# Patient Record
Sex: MALE | Race: WHITE | Employment: FULL TIME | URBAN - METROPOLITAN AREA
[De-identification: names, ages, dates, MRNs, and addresses within clinical notes are randomized per-mention and may not be internally consistent; named-entity substitution may affect disease eponyms.]

---

## 2017-07-08 ENCOUNTER — OFFICE VISIT (OUTPATIENT)
Dept: URGENT CARE | Facility: URGENT CARE | Age: 27
End: 2017-07-08
Payer: COMMERCIAL

## 2017-07-08 VITALS
TEMPERATURE: 98.7 F | OXYGEN SATURATION: 98 % | BODY MASS INDEX: 24.44 KG/M2 | DIASTOLIC BLOOD PRESSURE: 70 MMHG | HEART RATE: 99 BPM | SYSTOLIC BLOOD PRESSURE: 102 MMHG | WEIGHT: 165 LBS | HEIGHT: 69 IN

## 2017-07-08 DIAGNOSIS — R09.A2 SENSATION OF LUMP IN THROAT: Primary | ICD-10-CM

## 2017-07-08 DIAGNOSIS — R52 BODY ACHES: ICD-10-CM

## 2017-07-08 LAB
DEPRECATED S PYO AG THROAT QL EIA: NORMAL
MICRO REPORT STATUS: NORMAL
SPECIMEN SOURCE: NORMAL

## 2017-07-08 PROCEDURE — 99213 OFFICE O/P EST LOW 20 MIN: CPT | Performed by: FAMILY MEDICINE

## 2017-07-08 PROCEDURE — 87880 STREP A ASSAY W/OPTIC: CPT | Performed by: FAMILY MEDICINE

## 2017-07-08 PROCEDURE — 87081 CULTURE SCREEN ONLY: CPT | Performed by: FAMILY MEDICINE

## 2017-07-08 NOTE — PATIENT INSTRUCTIONS
Recheck in 48 hours with your primary provider.   Ibuprofen or aleve for the body aches.   Rest.  If any fevers (a temperature of 100.5 or above) or worsening symptoms develop over the weekend, return to care.

## 2017-07-08 NOTE — MR AVS SNAPSHOT
After Visit Summary   7/8/2017    Omer Graves    MRN: [id number]           Patient Information     Date Of Birth          1990        Visit Information        Provider Department      7/8/2017 11:45 AM Marietta Lovett DO [facility]        Today's Diagnoses     Sensation of lump in throat    -  1    Body aches          Care Instructions    Recheck in 48 hours with your primary provider.   Ibuprofen or aleve for the body aches.   Rest.  If any fevers (a temperature of 100.5 or above) or worsening symptoms develop over the weekend, return to care.            Follow-ups after your visit        Who to contact     If you have questions or need follow up information about today's clinic visit or your schedule please contact [facility] directly at [phone].  Normal or non-critical lab and imaging results will be communicated to you by Nextancehart, letter or phone within 4 business days after the clinic has received the results. If you do not hear from us within 7 days, please contact the clinic through Nextancehart or phone. If you have a critical or abnormal lab result, we will notify you by phone as soon as possible.  Submit refill requests through [facility] or call your pharmacy and they will forward the refill request to us. Please allow 3 business days for your refill to be completed.          Additional Information About Your Visit        MyChart Information     [facility] gives you secure access to your electronic health record. If you see a primary care provider, you can also send messages to your care team and make appointments. If you have questions, please call your primary care clinic.  If you do not have a primary care provider, please call [phone] and they will assist you.        Care EveryWhere ID     This is your Care EveryWhere ID. This could be used by other organizations to access your [place] medical records  [id number]       "  Your Vitals Were     Pulse Temperature Height Pulse Oximetry BMI (Body Mass Index)       99 98.7  F (37.1  C) (Oral) 5' 9\" (1.753 m) 98% 24.37 kg/m2        Blood Pressure from Last 3 Encounters:   07/08/17 102/70   11/01/13 121/78   09/13/12 114/66    Weight from Last 3 Encounters:   07/08/17 165 lb (74.8 kg)   11/05/13 170 lb (77.1 kg)   09/13/12 166 lb 9.6 oz (75.6 kg)              We Performed the Following     Beta strep group A culture     Strep, Rapid Screen          Today's Medication Changes          These changes are accurate as of: 7/8/17 12:46 PM.  If you have any questions, ask your nurse or doctor.               Stop taking these medicines if you haven't already. Please contact your care team if you have questions.     HYDROcodone-acetaminophen 5-325 MG per tablet   Commonly known as:  NORCO   Stopped by:  Marietta Lovett,                     Primary Care Provider    Physician No Ref-Primary       No address on file        Equal Access to Services     [facility]: Hadii aad ku hadasho Sogill, waaxda luqadaha, qaybta kaalmada adevirginia, clementine richardson . So [facility] [phone].    ATENCIÓN: Si habla español, tiene a mcgovern disposición servicios gratuitos de asistencia lingüística. Llame al [phone].    We comply with applicable federal civil rights laws and [place] laws. We do not discriminate on the basis of race, color, national origin, age, disability sex, sexual orientation or gender identity.            Thank you!     Thank you for choosing [facility]  for your care. Our goal is always to provide you with excellent care. Hearing back from our patients is one way we can continue to improve our services. Please take a few minutes to complete the written survey that you may receive in the mail after your visit with us. Thank you!             Your Updated Medication List - Protect others around you: Learn how to safely use, store and throw " away your medicines at www.disposemymeds.org.          This list is accurate as of: 7/8/17 12:46 PM.  Always use your most recent med list.                   Brand Name Dispense Instructions for use Diagnosis    NO ACTIVE MEDICATIONS
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
## 2017-07-08 NOTE — PROGRESS NOTES
"SUBJECTIVE:   Omer Graves is a 27 year old male presenting with a chief complaint of bite from a chipmunk.   He was camping and feeding some of the chipmunks at the campground that were friendly.  When he ran out of food, the chipmunk continued nibbling at the edge of his fingernail and it bled a little bit.  No puncture wound or deep bite occurred.  This happened on 7/3/17.   On 7/6/17 he started feeling ill with the sensation of a lump in his throat and sore throat when he swallows and body aches.  No joint redness or swelling.  He also has been feeling feverish, but when taking temps, no fevers.  No known tick bites.  Multiple mosquito bites.  No neck pain/stiffness or headaches.  No swollen lymph nodes noticed anywhere on the body.  No rashes.    ROS:  5-Point Review of Systems Negative-- Except as stated above.    OBJECTIVE  /70 (BP Location: Right arm, Patient Position: Chair, Cuff Size: Adult Regular)  Pulse 99  Temp 98.7  F (37.1  C) (Oral)  Ht 5' 9\" (1.753 m)  Wt 165 lb (74.8 kg)  SpO2 98%  BMI 24.37 kg/m2  GENERAL:  Awake, alert and interactive. No acute distress.  Appears ill.  HEENT:   NC/AT, EOMI, mild BL injection of conjunctiva.  Nose clear.  Oropharynx with mild diffuse erythema, moist and clear.  TM's and EAC's benign.  NECK: supple and free of adenopathy  CHEST:  Lungs are clear, no rhonchi, wheezing or rales. Normal symmetric air entry throughout both lung fields.   HEART:  S1 and S2 normal, no murmurs, clicks, gallops or rubs. Regular rate and rhythm.  SKIN:  Finger is benign.  No sign of trauma, bite, or healing wound is present.     Results for orders placed or performed in visit on 07/08/17   Strep, Rapid Screen   Result Value Ref Range    Specimen Description Throat     Rapid Strep A Screen       NEGATIVE: No Group A streptococcal antigen detected by immunoassay, await   culture report.      Micro Report Status FINAL 07/08/2017         ASSESSMENT/PLAN    ICD-10-CM    1. " Sensation of lump in throat R22.1 Strep, Rapid Screen     Beta strep group A culture   2. Body aches R52      Viral illness suspected.  Was camping just prior to onset of illness.  Multiple mosquito bites and possibly ticks although none found.  Monitor temps.  Recheck in 48 hours.    Patient Instructions   Recheck in 48 hours with your primary provider.   Ibuprofen or aleve for the body aches.   Rest.  If any fevers (a temperature of 100.5 or above) or worsening symptoms develop over the weekend, return to care.

## 2017-07-09 LAB
BACTERIA SPEC CULT: NORMAL
MICRO REPORT STATUS: NORMAL
SPECIMEN SOURCE: NORMAL

## 2018-07-10 ENCOUNTER — OFFICE VISIT (OUTPATIENT)
Dept: URGENT CARE | Facility: URGENT CARE | Age: 28
End: 2018-07-10
Payer: COMMERCIAL

## 2018-07-10 VITALS
DIASTOLIC BLOOD PRESSURE: 89 MMHG | OXYGEN SATURATION: 98 % | HEART RATE: 58 BPM | TEMPERATURE: 98.1 F | SYSTOLIC BLOOD PRESSURE: 132 MMHG

## 2018-07-10 DIAGNOSIS — R30.0 DYSURIA: Primary | ICD-10-CM

## 2018-07-10 DIAGNOSIS — R10.9 FLANK PAIN: ICD-10-CM

## 2018-07-10 LAB
ALBUMIN UR-MCNC: NEGATIVE MG/DL
APPEARANCE UR: CLEAR
BILIRUB UR QL STRIP: NEGATIVE
COLOR UR AUTO: YELLOW
GLUCOSE UR STRIP-MCNC: NEGATIVE MG/DL
HGB UR QL STRIP: NEGATIVE
KETONES UR STRIP-MCNC: NEGATIVE MG/DL
LEUKOCYTE ESTERASE UR QL STRIP: NEGATIVE
NITRATE UR QL: NEGATIVE
PH UR STRIP: 7 PH (ref 5–7)
SOURCE: NORMAL
SP GR UR STRIP: 1.01 (ref 1–1.03)
UROBILINOGEN UR STRIP-ACNC: 0.2 EU/DL (ref 0.2–1)

## 2018-07-10 PROCEDURE — 99214 OFFICE O/P EST MOD 30 MIN: CPT | Performed by: PHYSICIAN ASSISTANT

## 2018-07-10 PROCEDURE — 87086 URINE CULTURE/COLONY COUNT: CPT | Performed by: PHYSICIAN ASSISTANT

## 2018-07-10 PROCEDURE — 81003 URINALYSIS AUTO W/O SCOPE: CPT | Performed by: PHYSICIAN ASSISTANT

## 2018-07-10 ASSESSMENT — ENCOUNTER SYMPTOMS
HEMATURIA: 0
ABDOMINAL PAIN: 0
DYSURIA: 1
NAUSEA: 0
DIARRHEA: 0
FREQUENCY: 0
CHILLS: 0
FEVER: 0
MYALGIAS: 0
SHORTNESS OF BREATH: 0
FLANK PAIN: 1
VOMITING: 0

## 2018-07-10 NOTE — PROGRESS NOTES
HPI  July 10, 2018    HPI: Omer Graves is a 28 year old male who complains of dysuria, R testicular pain, and intermittent R flank pain onset 2 days ago. Pt had similar episode 2 months ago and symptoms resolved until a few days ago. In early May he had STD testing which was negative. No treatments tried. No known alleviating or aggravating factors. Symptoms are moderate in severity & constant in duration. Denies hematuria, penile discharge, genital sores, abd pain, testicular mass/swelling, N/V/D, and any other symptoms. No known hx kidney stones.      History reviewed. No pertinent past medical history.  Past Surgical History:   Procedure Laterality Date     DENTAL SURGERY       Social History   Substance Use Topics     Smoking status: Never Smoker     Smokeless tobacco: Never Used     Alcohol use No       Problem list, Medication list, Allergies, and Medical/Social/Surgical histories reviewed in [facility] and updated as appropriate.      Review of Systems   Constitutional: Negative for chills and fever.   Respiratory: Negative for shortness of breath.    Cardiovascular: Negative for chest pain.   Gastrointestinal: Negative for abdominal pain, diarrhea, nausea and vomiting.   Genitourinary: Positive for dysuria and flank pain. Negative for frequency and hematuria.        Testicular pain   Musculoskeletal: Negative for myalgias.   All other systems reviewed and are negative.        Physical Exam   Constitutional: He is oriented to person, place, and time and well-developed, well-nourished, and in no distress.   Cardiovascular: Normal rate and regular rhythm.    Pulmonary/Chest: Effort normal and breath sounds normal.   Abdominal: Soft. Normal appearance. There is no tenderness. There is CVA tenderness (right).   Neurological: He is oriented to person, place, and time.   Skin: Skin is warm, dry and intact.   Nursing note and vitals reviewed.      Vital Signs  /89  Pulse 58  Temp 98.1  F (36.7  C) (Oral)   SpO2 98%     Diagnostic Test Results:  Results for orders placed or performed in visit on 07/10/18 (from the past 24 hour(s))   *UA reflex to Microscopic and Culture ([place] and [facility] (except [place] and [place])   Result Value Ref Range    Color Urine Yellow     Appearance Urine Clear     Glucose Urine Negative NEG^Negative mg/dL    Bilirubin Urine Negative NEG^Negative    Ketones Urine Negative NEG^Negative mg/dL    Specific Gravity Urine 1.010 1.003 - 1.035    Blood Urine Negative NEG^Negative    pH Urine 7.0 5.0 - 7.0 pH    Protein Albumin Urine Negative NEG^Negative mg/dL    Urobilinogen Urine 0.2 0.2 - 1.0 EU/dL    Nitrite Urine Negative NEG^Negative    Leukocyte Esterase Urine Negative NEG^Negative    Source Midstream Urine        ASSESSMENT/PLAN      ICD-10-CM    1. Dysuria R30.0 *UA reflex to Microscopic and Culture ([place] and [facility] (except [place] and [place])     Urine Culture Aerobic Bacterial   2. Flank pain R10.9         UA Negative- pt requests culture. With CVAT and testicular pain, suspect kidney stone. Advised CT abd/pelvis to confirm this as pt has no prior history but he declines. F/u with PCP if symptoms persist. Go to ER if develop fever, pain worsens, or pt develops difficulty urinating. Offered urine strainer & Flomax- pt declines. Increase fluid intake.     I have discussed any lab or imaging results, the patient's diagnosis, and my plan of treatment with the patient and/or family. Patient is aware to come back in if with worsening symptoms or if no relief despite treatment plan.  Patient voiced understanding and had no further questions.       Follow Up: Data Unavailable    MAICOL Celis, PA-C  [facility]

## 2018-07-10 NOTE — MR AVS SNAPSHOT
After Visit Summary   7/10/2018    Omer Graves    MRN: [id number]           Patient Information     Date Of Birth          1990        Visit Information        Provider Department      7/10/2018 6:25 PM Marietta Petty PA-C [facility]        Today's Diagnoses     Dysuria    -  1    Flank pain           Follow-ups after your visit        Who to contact     If you have questions or need follow up information about today's clinic visit or your schedule please contact [facility] directly at [phone].  Normal or non-critical lab and imaging results will be communicated to you by Fabbeohart, letter or phone within 4 business days after the clinic has received the results. If you do not hear from us within 7 days, please contact the clinic through [facility] or phone. If you have a critical or abnormal lab result, we will notify you by phone as soon as possible.  Submit refill requests through [facility] or call your pharmacy and they will forward the refill request to us. Please allow 3 business days for your refill to be completed.          Additional Information About Your Visit        MyChart Information     [facility] gives you secure access to your electronic health record. If you see a primary care provider, you can also send messages to your care team and make appointments. If you have questions, please call your primary care clinic.  If you do not have a primary care provider, please call [phone] and they will assist you.        Care EveryWhere ID     This is your Care EveryWhere ID. This could be used by other organizations to access your [place] medical records  [id number]        Your Vitals Were     Pulse Temperature Pulse Oximetry             58 98.1  F (36.7  C) (Oral) 98%          Blood Pressure from Last 3 Encounters:   07/10/18 132/89   07/08/17 102/70   11/01/13 121/78    Weight from Last 3 Encounters:   07/08/17 165 lb (74.8  kg)   11/05/13 170 lb (77.1 kg)   09/13/12 166 lb 9.6 oz (75.6 kg)              We Performed the Following     *UA reflex to Microscopic and Culture ([place] and [facility] (except [place] and [place])     Urine Culture Aerobic Bacterial        Primary Care Provider Fax #    Physician No Ref-Primary [phone]       No address on file        Equal Access to Services     [facility] : Hadii aad ku hadasho Soomaali, waaxda luqadaha, qaybta kaalmada adeegyada, waxay idiin hayaan ademarlene gilbertradamesbarron lakg . So [facility] [phone].    ATENCIÓN: Si habla español, tiene a mcgovern disposición servicios gratuitos de asistencia lingüística. Llame al [phone].    We comply with applicable federal civil rights laws and [place] laws. We do not discriminate on the basis of race, color, national origin, age, disability, sex, sexual orientation, or gender identity.            Thank you!     Thank you for choosing [facility]  for your care. Our goal is always to provide you with excellent care. Hearing back from our patients is one way we can continue to improve our services. Please take a few minutes to complete the written survey that you may receive in the mail after your visit with us. Thank you!             Your Updated Medication List - Protect others around you: Learn how to safely use, store and throw away your medicines at www.disposemymeds.org.          This list is accurate as of 7/10/18  9:00 PM.  Always use your most recent med list.                   Brand Name Dispense Instructions for use Diagnosis    NO ACTIVE MEDICATIONS

## 2018-07-12 LAB
BACTERIA SPEC CULT: NORMAL
SPECIMEN SOURCE: NORMAL

## 2020-03-02 ENCOUNTER — HEALTH MAINTENANCE LETTER (OUTPATIENT)
Age: 30
End: 2020-03-02

## 2020-03-09 ENCOUNTER — OFFICE VISIT (OUTPATIENT)
Dept: URGENT CARE | Facility: URGENT CARE | Age: 30
End: 2020-03-09
Payer: COMMERCIAL

## 2020-03-09 VITALS
OXYGEN SATURATION: 96 % | TEMPERATURE: 98 F | SYSTOLIC BLOOD PRESSURE: 120 MMHG | WEIGHT: 165 LBS | HEART RATE: 78 BPM | BODY MASS INDEX: 24.37 KG/M2 | DIASTOLIC BLOOD PRESSURE: 75 MMHG

## 2020-03-09 DIAGNOSIS — T14.8XXA FOREIGN BODY IN SKIN: Primary | ICD-10-CM

## 2020-03-10 ENCOUNTER — HOSPITAL ENCOUNTER (EMERGENCY)
Facility: CLINIC | Age: 30
Discharge: HOME | End: 2020-03-10
Attending: NURSE PRACTITIONER | Admitting: NURSE PRACTITIONER
Payer: COMMERCIAL

## 2020-03-10 VITALS
DIASTOLIC BLOOD PRESSURE: 79 MMHG | HEIGHT: 69 IN | WEIGHT: 168 LBS | TEMPERATURE: 97.6 F | SYSTOLIC BLOOD PRESSURE: 132 MMHG | BODY MASS INDEX: 24.88 KG/M2 | OXYGEN SATURATION: 99 %

## 2020-03-10 DIAGNOSIS — L03.115 CELLULITIS OF RIGHT LOWER EXTREMITY: Primary | ICD-10-CM

## 2020-03-10 DIAGNOSIS — T14.8XXA FOREIGN BODY/SPLINTER, SKIN: ICD-10-CM
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
## 2020-03-10 PROCEDURE — 25000132 ZZH RX MED GY IP 250 OP 250 PS 637: Performed by: NURSE PRACTITIONER

## 2020-03-10 PROCEDURE — 99283 EMERGENCY DEPT VISIT LOW MDM: CPT | Mod: 25

## 2020-03-10 PROCEDURE — 10120 INC&RMVL FB SUBQ TISS SMPL: CPT

## 2020-03-10 RX ORDER — CEPHALEXIN 500 MG/1
500 CAPSULE ORAL ONCE
Status: COMPLETED | OUTPATIENT
Start: 2020-03-10 | End: 2020-03-10

## 2020-03-10 RX ORDER — CEPHALEXIN 500 MG/1
500 CAPSULE ORAL 4 TIMES DAILY
Qty: 28 CAPSULE | Refills: 0 | Status: SHIPPED | OUTPATIENT
Start: 2020-03-10 | End: 2020-03-10

## 2020-03-10 RX ORDER — CEPHALEXIN 500 MG/1
500 CAPSULE ORAL 4 TIMES DAILY
Qty: 28 CAPSULE | Refills: 0 | Status: SHIPPED | OUTPATIENT
Start: 2020-03-10

## 2020-03-10 RX ADMIN — CEPHALEXIN 500 MG: 500 CAPSULE ORAL at 20:14

## 2020-03-10 ASSESSMENT — ENCOUNTER SYMPTOMS
WOUND: 1
BACK PAIN: 0

## 2020-03-10 ASSESSMENT — MIFFLIN-ST. JEOR: SCORE: 1722.42

## 2020-03-10 NOTE — ED AVS SNAPSHOT
Emergency Department  [address] [facility] [address]  Phone:  [phone]  Fax:  [phone]                                    Omer Graves   MRN: [id number]    Department:   Emergency Department   Date of Visit:  3/10/2020           After Visit Summary Signature Page    I have received my discharge instructions, and my questions have been answered. I have discussed any challenges I see with this plan with the nurse or doctor.    ..........................................................................................................................................  Patient/Patient Representative Signature      ..........................................................................................................................................  Patient Representative Print Name and Relationship to Patient    ..................................................               ................................................  Date                                   Time    ..........................................................................................................................................  Reviewed by Signature/Title    ...................................................              ..............................................  Date                                               Time          22EPIC Rev 08/18
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
## 2020-03-13 ENCOUNTER — OFFICE VISIT (OUTPATIENT)
Dept: FAMILY MEDICINE | Facility: CLINIC | Age: 30
End: 2020-03-13
Payer: COMMERCIAL

## 2020-03-13 ENCOUNTER — TELEPHONE (OUTPATIENT)
Dept: WOUND CARE | Facility: CLINIC | Age: 30
End: 2020-03-13

## 2020-03-13 VITALS
WEIGHT: 168 LBS | DIASTOLIC BLOOD PRESSURE: 76 MMHG | TEMPERATURE: 98.3 F | SYSTOLIC BLOOD PRESSURE: 133 MMHG | BODY MASS INDEX: 24.88 KG/M2 | OXYGEN SATURATION: 97 % | HEART RATE: 72 BPM | HEIGHT: 69 IN

## 2020-03-13 DIAGNOSIS — L08.9 LOCAL INFECTION OF WOUND: Primary | ICD-10-CM

## 2020-03-13 DIAGNOSIS — T14.8XXA LOCAL INFECTION OF WOUND: Primary | ICD-10-CM

## 2020-03-13 PROCEDURE — 99213 OFFICE O/P EST LOW 20 MIN: CPT | Performed by: INTERNAL MEDICINE

## 2020-03-13 ASSESSMENT — MIFFLIN-ST. JEOR: SCORE: 1722.42

## 2020-03-13 NOTE — PROGRESS NOTES
Madonna Graves is a 29 year old male who presents to clinic today for the following health issues:    HPI   New Patient/Transfer of Care  ED/UC Followup:    Facility:  [facility]  Date of visit: 3/10/2020  Reason for visit: Cellulitis  Current Status: at home     Patient presenting for follow-up from ED he was seen on 3/10 for right lower extremity wounds secondary to an injury he sustained while he was hiking/biking in [place] sustained small splints with wound injury on his right lower extremity that was seen in ED and wound irrigated, visible splints removed and tetanus vaccine is up-to-date, as per report there was removal of small piece of wood sliver from his right lower extremity; there is 2 wounds one above the lateral malleolus of right ankle and another wound on the distal lateral shin area, ED performed  did a small cut incision; stating they removed the splint but the lower one above the malleolus it was not irrigated nor could they remove any splints from that wound , Triple Antibiotic applied as well discharged on Keflex 7-day course which she has been taking and tolerating well, denies any pain in his right lower extremity or any swelling or any pain with ambulation or walking or any discomfort or any fevers or chills there is no spreading of the redness, actually the wound is appearing better according to patient, no drainage from the wounds or bleeding.      There is no problem list on file for this patient.    Past Surgical History:   Procedure Laterality Date     DENTAL SURGERY         Social History     Tobacco Use     Smoking status: Never Smoker     Smokeless tobacco: Never Used   Substance Use Topics     Alcohol use: No     Family History   Problem Relation Age of Onset     Nephrolithiasis No family hx of          Current Outpatient Medications   Medication Sig Dispense Refill     cephALEXin (KEFLEX) 500 MG capsule Take 1 capsule (500 mg) by mouth 4 times daily 28 capsule 0     NO  "ACTIVE MEDICATIONS        Allergies   Allergen Reactions     Seasonal Allergies Other (See Comments)     Sneezing, rhinitis.     Recent Labs   Lab Test 12/13/12  0805 09/13/12  0909 08/17/12  1923 08/02/12  1023   ALT 55 91* 453* 37   CR  --   --   --  1.22   GFRESTIMATED  --   --   --  75   GFRESTBLACK  --   --   --  >90   POTASSIUM  --   --   --  4.2      BP Readings from Last 3 Encounters:   03/13/20 133/76   03/10/20 132/79   03/09/20 120/75    Wt Readings from Last 3 Encounters:   03/13/20 76.2 kg (168 lb)   03/10/20 76.2 kg (168 lb)   03/09/20 74.8 kg (165 lb)                    Reviewed and updated as needed this visit by Provider         Review of Systems   ROS COMP: Constitutional, HEENT, cardiovascular, pulmonary, gi and gu systems are negative, except as otherwise noted.      Objective    /76 (BP Location: Right arm, Patient Position: Chair, Cuff Size: Adult Regular)   Pulse 72   Temp 98.3  F (36.8  C) (Tympanic)   Ht 1.753 m (5' 9\")   Wt 76.2 kg (168 lb)   SpO2 97%   BMI 24.81 kg/m    Body mass index is 24.81 kg/m .  Physical Exam     GEN appears not in acute distress  Right lower extremity there is small 1 cm  Red induration; in the middle there is some yellow granulation tissue, area non tender to palpation, no purulent drainage, no evidence of cellulitis,  the wound above the right lateral malleolus there is a small red collection with some dry blood scabs around , the area is slightly tender to palpation over the malleolus , the wound over the lateral mid shin area there is redness but there is no pain, there is no spreading of the redness, there is no evidence of cellulitis, no red streaks.  No leg swelling.  Diagnostic Test Results:  Labs reviewed in Epic        Assessment & Plan   Problem List Items Addressed This Visit     None      Visit Diagnoses     Local infection of wound    -  Primary    Relevant Orders    WOUND CARE REFERRAL         Advised to follow-up with the wound clinic, " there is no evidence of cellulitis, the wounds appear slightly red, there is no drainage no bleeding the wound on the shin lateral area appears healing there is some white granulation tissue in the centre, minimal erythroderma formation surrounding , some soft tissue swelling , no tenderness on deep palpation, the calf muscles are soft ,no evidence of cellulitis;   the wound over the right lateral malleolus there is a small collection that appears soft and red not tender to palpation with small dry blood scabs surrounding.  Could not appreciate any foreign objects in both wounds, soft tissue appears soft ,no hard object palpated.  Patient has intact neurovascular exam of right lower extremity, pulses to dorsalis pedis posterior tibials are intact , Cap refil less than 3 seconds.  advised if any increase redness, pain, swelling follow-up immediately for evaluation -continue on the antibiotics, we did change the dressing in the clinic put some bacitracin ointment ,covered with clean gauze ; advised to also keep the area exposed to air as possible helps with the wound healing.  He will follow-up with wound clinic next week, advised any fevers or chills follow-up immediately for evaluation, no evidence of any suspicion for necrotizing fasciitis or cellulitis on exam, no compartmental syndrome, no evidence of active infection.      Work on weight loss  Regular exercise  See Patient Instructions  Return in about 3 days (around 3/16/2020), or if symptoms worsen or fail to improve, for PCP, other, wound clinic.    Mamta Lee MD  [facility]

## 2020-03-17 ENCOUNTER — TELEPHONE (OUTPATIENT)
Dept: WOUND CARE | Facility: CLINIC | Age: 30
End: 2020-03-17

## 2020-03-17 NOTE — TELEPHONE ENCOUNTER
Attempted to reach patient about wound referral unable to schedule due to being closed for Covid 19. Left message for Patient to call back to discuss care options.

## 2020-03-30 ENCOUNTER — TELEPHONE (OUTPATIENT)
Dept: WOUND CARE | Facility: CLINIC | Age: 30
End: 2020-03-30

## 2020-03-30 NOTE — TELEPHONE ENCOUNTER
We received wound care referral, attempted to reach patient to schedule. Spoke to him. At this time he feels wound is healing fine. He was given the number the call if need.

## 2020-12-14 ENCOUNTER — HEALTH MAINTENANCE LETTER (OUTPATIENT)
Age: 30
End: 2020-12-14

## 2021-04-18 ENCOUNTER — HEALTH MAINTENANCE LETTER (OUTPATIENT)
Age: 31
End: 2021-04-18

## 2021-10-02 ENCOUNTER — HEALTH MAINTENANCE LETTER (OUTPATIENT)
Age: 31
End: 2021-10-02

## 2022-05-14 ENCOUNTER — HEALTH MAINTENANCE LETTER (OUTPATIENT)
Age: 32
End: 2022-05-14

## 2022-05-25 ENCOUNTER — APPOINTMENT (OUTPATIENT)
Dept: CARDIOLOGY | Age: 32
End: 2022-05-25

## 2022-09-03 ENCOUNTER — HEALTH MAINTENANCE LETTER (OUTPATIENT)
Age: 32
End: 2022-09-03

## 2023-06-03 ENCOUNTER — HEALTH MAINTENANCE LETTER (OUTPATIENT)
Age: 33
End: 2023-06-03